# Patient Record
Sex: FEMALE | Race: WHITE | NOT HISPANIC OR LATINO
[De-identification: names, ages, dates, MRNs, and addresses within clinical notes are randomized per-mention and may not be internally consistent; named-entity substitution may affect disease eponyms.]

---

## 2022-08-12 PROBLEM — Z00.00 ENCOUNTER FOR PREVENTIVE HEALTH EXAMINATION: Status: ACTIVE | Noted: 2022-08-12

## 2022-08-16 ENCOUNTER — APPOINTMENT (OUTPATIENT)
Dept: OBGYN | Facility: CLINIC | Age: 32
End: 2022-08-16

## 2022-08-16 ENCOUNTER — RESULT CHARGE (OUTPATIENT)
Age: 32
End: 2022-08-16

## 2022-08-16 VITALS
WEIGHT: 135 LBS | BODY MASS INDEX: 23.92 KG/M2 | SYSTOLIC BLOOD PRESSURE: 100 MMHG | HEIGHT: 63 IN | DIASTOLIC BLOOD PRESSURE: 67 MMHG

## 2022-08-16 DIAGNOSIS — Z87.42 PERSONAL HISTORY OF OTHER DISEASES OF THE FEMALE GENITAL TRACT: ICD-10-CM

## 2022-08-16 DIAGNOSIS — Z80.0 FAMILY HISTORY OF MALIGNANT NEOPLASM OF DIGESTIVE ORGANS: ICD-10-CM

## 2022-08-16 DIAGNOSIS — Z78.9 OTHER SPECIFIED HEALTH STATUS: ICD-10-CM

## 2022-08-16 LAB — HCG UR QL: POSITIVE

## 2022-08-16 PROCEDURE — 76815 OB US LIMITED FETUS(S): CPT

## 2022-08-16 PROCEDURE — 81025 URINE PREGNANCY TEST: CPT

## 2022-08-16 PROCEDURE — 99204 OFFICE O/P NEW MOD 45 MIN: CPT

## 2022-08-16 RX ORDER — PSYLLIUM HUSK 3.4 G/12G
POWDER, FOR SUSPENSION ORAL
Refills: 0 | Status: ACTIVE | COMMUNITY

## 2022-08-16 RX ORDER — DOXYLAMINE SUCCINATE 25 MG
25 TABLET ORAL
Refills: 0 | Status: ACTIVE | COMMUNITY

## 2022-09-05 ENCOUNTER — NON-APPOINTMENT (OUTPATIENT)
Age: 32
End: 2022-09-05

## 2022-09-06 ENCOUNTER — NON-APPOINTMENT (OUTPATIENT)
Age: 32
End: 2022-09-06

## 2022-09-06 ENCOUNTER — APPOINTMENT (OUTPATIENT)
Dept: OBGYN | Facility: CLINIC | Age: 32
End: 2022-09-06

## 2022-09-06 VITALS
DIASTOLIC BLOOD PRESSURE: 69 MMHG | BODY MASS INDEX: 23.74 KG/M2 | WEIGHT: 134 LBS | HEIGHT: 63 IN | SYSTOLIC BLOOD PRESSURE: 110 MMHG

## 2022-09-06 PROCEDURE — 76816 OB US FOLLOW-UP PER FETUS: CPT

## 2022-09-06 PROCEDURE — 0501F PRENATAL FLOW SHEET: CPT

## 2022-09-08 NOTE — HISTORY OF PRESENT ILLNESS
[FreeTextEntry1] : 32-year-old female -0-1-0 at 7 and 4/7 weeks by LMP of 2022 presents for positive urine pregnancy test at home.  She is complaining of mild nausea but denies vomiting.  She has felt that the nausea has been well controlled by dietary modifications.  She has also been taking Unisom and B6 which she feels has helped with the nausea.  She denies vaginal bleeding.  She denies cramping.  She is taking prenatal vitamins.\par \par She has no significant past medical history.  Past surgical history includes a breast augmentation.  Family history is significant for a maternal grandmother with colon cancer.  Social history is negative x3.  OB history: -0-1-0.  She has a history of a medical termination of pregnancy.  She has no known drug allergies.  She is taking prenatal vitamins.  She is also taking Unisom and B6.

## 2022-09-08 NOTE — PLAN
[FreeTextEntry1] : 32-year-old female -0-1-0 at 7 and 4/7 weeks by LMP of 2022.  EDC confirmed by today's sonogram of 3/31/2023.  Do's and don'ts of pregnancy were reviewed with the patient.  She is complaining of significant nausea but has had relief with Unisom, B6 and dietary modifications.  She declines other medications at this time.  Call parameters were reviewed.  She will return to the office in 2 weeks for her first prenatal visit and sonogram.  The patient was given the opportunity to ask questions and all were answered to her satisfaction.\par

## 2022-09-21 ENCOUNTER — APPOINTMENT (OUTPATIENT)
Dept: ANTEPARTUM | Facility: CLINIC | Age: 32
End: 2022-09-21

## 2022-09-21 ENCOUNTER — ASOB RESULT (OUTPATIENT)
Age: 32
End: 2022-09-21

## 2022-09-21 ENCOUNTER — NON-APPOINTMENT (OUTPATIENT)
Age: 32
End: 2022-09-21

## 2022-09-21 PROCEDURE — 36415 COLL VENOUS BLD VENIPUNCTURE: CPT

## 2022-09-21 PROCEDURE — 76813 OB US NUCHAL MEAS 1 GEST: CPT

## 2022-09-23 ENCOUNTER — LABORATORY RESULT (OUTPATIENT)
Age: 32
End: 2022-09-23

## 2022-09-29 ENCOUNTER — NON-APPOINTMENT (OUTPATIENT)
Age: 32
End: 2022-09-29

## 2022-10-05 ENCOUNTER — APPOINTMENT (OUTPATIENT)
Dept: OBGYN | Facility: CLINIC | Age: 32
End: 2022-10-05

## 2022-10-05 VITALS
HEIGHT: 63 IN | BODY MASS INDEX: 23.92 KG/M2 | SYSTOLIC BLOOD PRESSURE: 110 MMHG | DIASTOLIC BLOOD PRESSURE: 70 MMHG | WEIGHT: 135 LBS

## 2022-10-05 PROCEDURE — 0502F SUBSEQUENT PRENATAL CARE: CPT

## 2022-10-06 LAB — AFP PNL SERPL: NORMAL

## 2022-11-02 ENCOUNTER — APPOINTMENT (OUTPATIENT)
Dept: OBGYN | Facility: CLINIC | Age: 32
End: 2022-11-02

## 2022-11-02 VITALS
SYSTOLIC BLOOD PRESSURE: 90 MMHG | HEIGHT: 63 IN | BODY MASS INDEX: 25.16 KG/M2 | DIASTOLIC BLOOD PRESSURE: 60 MMHG | WEIGHT: 142 LBS

## 2022-11-02 PROCEDURE — 0502F SUBSEQUENT PRENATAL CARE: CPT

## 2022-11-03 ENCOUNTER — NON-APPOINTMENT (OUTPATIENT)
Age: 32
End: 2022-11-03

## 2022-11-03 LAB
CLARI ADDITIONAL INFO: NORMAL
CLARI CHROMOSOME 13: NORMAL
CLARI CHROMOSOME 18: NORMAL
CLARI CHROMOSOME 21: NORMAL
CLARI SEX CHROMOSOMES: NORMAL
CLARI TEST COMMENT: NORMAL
CLARITEST NIPT: NORMAL
FETAL FRACT: NORMAL
GESTATION AGE: NORMAL
MATERNAL WEIGHT (LBS):: 165
PLEASE INCLUDE GENDER RESULTS ON THIS REPORT:: NORMAL
TYPE OF PREGNANCY:: NORMAL

## 2022-11-10 ENCOUNTER — APPOINTMENT (OUTPATIENT)
Dept: ANTEPARTUM | Facility: CLINIC | Age: 32
End: 2022-11-10

## 2022-11-10 ENCOUNTER — ASOB RESULT (OUTPATIENT)
Age: 32
End: 2022-11-10

## 2022-11-10 PROCEDURE — 76805 OB US >/= 14 WKS SNGL FETUS: CPT

## 2022-11-22 LAB — AFP PNL SERPL: NORMAL

## 2022-11-29 ENCOUNTER — NON-APPOINTMENT (OUTPATIENT)
Age: 32
End: 2022-11-29

## 2022-11-30 ENCOUNTER — APPOINTMENT (OUTPATIENT)
Dept: OBGYN | Facility: CLINIC | Age: 32
End: 2022-11-30
Payer: COMMERCIAL

## 2022-11-30 VITALS
HEIGHT: 63 IN | DIASTOLIC BLOOD PRESSURE: 70 MMHG | SYSTOLIC BLOOD PRESSURE: 110 MMHG | BODY MASS INDEX: 26.58 KG/M2 | WEIGHT: 150 LBS

## 2022-11-30 PROCEDURE — 0502F SUBSEQUENT PRENATAL CARE: CPT

## 2022-11-30 PROCEDURE — 90471 IMMUNIZATION ADMIN: CPT

## 2022-11-30 PROCEDURE — 90686 IIV4 VACC NO PRSV 0.5 ML IM: CPT

## 2022-12-23 ENCOUNTER — NON-APPOINTMENT (OUTPATIENT)
Age: 32
End: 2022-12-23

## 2022-12-28 ENCOUNTER — TRANSCRIPTION ENCOUNTER (OUTPATIENT)
Age: 32
End: 2022-12-28

## 2022-12-28 ENCOUNTER — APPOINTMENT (OUTPATIENT)
Dept: OBGYN | Facility: CLINIC | Age: 32
End: 2022-12-28

## 2022-12-28 VITALS
HEIGHT: 63 IN | WEIGHT: 153 LBS | DIASTOLIC BLOOD PRESSURE: 70 MMHG | SYSTOLIC BLOOD PRESSURE: 110 MMHG | BODY MASS INDEX: 27.11 KG/M2

## 2022-12-28 DIAGNOSIS — O35.1XX0 MATERNAL CARE FOR (SUSPECTED) CHROMOSOMAL ABNORMALITY IN FETUS, NOT APPLICABLE OR UNSPECIFIED: ICD-10-CM

## 2022-12-28 DIAGNOSIS — Z32.01 ENCOUNTER FOR PREGNANCY TEST, RESULT POSITIVE: ICD-10-CM

## 2022-12-28 PROCEDURE — 0502F SUBSEQUENT PRENATAL CARE: CPT

## 2023-01-03 ENCOUNTER — NON-APPOINTMENT (OUTPATIENT)
Age: 33
End: 2023-01-03

## 2023-01-03 LAB
BASOPHILS # BLD AUTO: 0.04 K/UL
BASOPHILS NFR BLD AUTO: 0.5 %
EOSINOPHIL # BLD AUTO: 0.04 K/UL
EOSINOPHIL NFR BLD AUTO: 0.5 %
GLUCOSE 1H P 50 G GLC PO SERPL-MCNC: 119 MG/DL
HCT VFR BLD CALC: 32.5 %
HGB BLD-MCNC: 10.5 G/DL
IMM GRANULOCYTES NFR BLD AUTO: 0.3 %
LYMPHOCYTES # BLD AUTO: 1.53 K/UL
LYMPHOCYTES NFR BLD AUTO: 19.9 %
MAN DIFF?: NORMAL
MCHC RBC-ENTMCNC: 31.8 PG
MCHC RBC-ENTMCNC: 32.3 GM/DL
MCV RBC AUTO: 98.5 FL
MONOCYTES # BLD AUTO: 0.42 K/UL
MONOCYTES NFR BLD AUTO: 5.5 %
NEUTROPHILS # BLD AUTO: 5.62 K/UL
NEUTROPHILS NFR BLD AUTO: 73.3 %
PLATELET # BLD AUTO: 256 K/UL
RBC # BLD: 3.3 M/UL
RBC # FLD: 13.2 %
WBC # FLD AUTO: 7.67 K/UL

## 2023-01-18 ENCOUNTER — APPOINTMENT (OUTPATIENT)
Dept: OBGYN | Facility: CLINIC | Age: 33
End: 2023-01-18
Payer: COMMERCIAL

## 2023-01-18 ENCOUNTER — EMERGENCY (EMERGENCY)
Facility: HOSPITAL | Age: 33
LOS: 1 days | Discharge: HOME | End: 2023-01-18
Attending: EMERGENCY MEDICINE
Payer: COMMERCIAL

## 2023-01-18 ENCOUNTER — OUTPATIENT (OUTPATIENT)
Dept: OUTPATIENT SERVICES | Facility: HOSPITAL | Age: 33
LOS: 1 days | End: 2023-01-18
Payer: COMMERCIAL

## 2023-01-18 VITALS
DIASTOLIC BLOOD PRESSURE: 69 MMHG | SYSTOLIC BLOOD PRESSURE: 120 MMHG | HEIGHT: 64 IN | TEMPERATURE: 99 F | OXYGEN SATURATION: 100 % | RESPIRATION RATE: 16 BRPM | HEART RATE: 101 BPM | WEIGHT: 164.91 LBS

## 2023-01-18 VITALS
OXYGEN SATURATION: 99 % | SYSTOLIC BLOOD PRESSURE: 106 MMHG | TEMPERATURE: 98 F | HEART RATE: 95 BPM | RESPIRATION RATE: 16 BRPM | DIASTOLIC BLOOD PRESSURE: 69 MMHG

## 2023-01-18 VITALS
DIASTOLIC BLOOD PRESSURE: 70 MMHG | WEIGHT: 157 LBS | BODY MASS INDEX: 27.82 KG/M2 | HEIGHT: 63 IN | SYSTOLIC BLOOD PRESSURE: 100 MMHG

## 2023-01-18 VITALS
HEART RATE: 88 BPM | TEMPERATURE: 99 F | SYSTOLIC BLOOD PRESSURE: 104 MMHG | DIASTOLIC BLOOD PRESSURE: 58 MMHG | RESPIRATION RATE: 16 BRPM

## 2023-01-18 VITALS — OXYGEN SATURATION: 100 % | HEART RATE: 95 BPM

## 2023-01-18 DIAGNOSIS — O47.03 FALSE LABOR BEFORE 37 COMPLETED WEEKS OF GESTATION, THIRD TRIMESTER: ICD-10-CM

## 2023-01-18 LAB
ALBUMIN SERPL ELPH-MCNC: 3.5 G/DL — SIGNIFICANT CHANGE UP (ref 3.3–5.2)
ALP SERPL-CCNC: 78 U/L — SIGNIFICANT CHANGE UP (ref 40–120)
ALT FLD-CCNC: 17 U/L — SIGNIFICANT CHANGE UP
ANION GAP SERPL CALC-SCNC: 10 MMOL/L — SIGNIFICANT CHANGE UP (ref 5–17)
APTT BLD: 25.2 SEC — LOW (ref 27.5–35.5)
AST SERPL-CCNC: 19 U/L — SIGNIFICANT CHANGE UP
BASOPHILS # BLD AUTO: 0.04 K/UL — SIGNIFICANT CHANGE UP (ref 0–0.2)
BASOPHILS NFR BLD AUTO: 0.4 % — SIGNIFICANT CHANGE UP (ref 0–2)
BILIRUB SERPL-MCNC: <0.2 MG/DL — LOW (ref 0.4–2)
BUN SERPL-MCNC: 7.6 MG/DL — LOW (ref 8–20)
CALCIUM SERPL-MCNC: 8.8 MG/DL — SIGNIFICANT CHANGE UP (ref 8.4–10.5)
CHLORIDE SERPL-SCNC: 104 MMOL/L — SIGNIFICANT CHANGE UP (ref 96–108)
CO2 SERPL-SCNC: 22 MMOL/L — SIGNIFICANT CHANGE UP (ref 22–29)
CREAT SERPL-MCNC: 0.39 MG/DL — LOW (ref 0.5–1.3)
D DIMER BLD IA.RAPID-MCNC: 202 NG/ML DDU — SIGNIFICANT CHANGE UP
EGFR: 136 ML/MIN/1.73M2 — SIGNIFICANT CHANGE UP
EOSINOPHIL # BLD AUTO: 0.03 K/UL — SIGNIFICANT CHANGE UP (ref 0–0.5)
EOSINOPHIL NFR BLD AUTO: 0.3 % — SIGNIFICANT CHANGE UP (ref 0–6)
GLUCOSE SERPL-MCNC: 75 MG/DL — SIGNIFICANT CHANGE UP (ref 70–99)
HCT VFR BLD CALC: 31.8 % — LOW (ref 34.5–45)
HGB BLD-MCNC: 11.1 G/DL — LOW (ref 11.5–15.5)
IMM GRANULOCYTES NFR BLD AUTO: 0.4 % — SIGNIFICANT CHANGE UP (ref 0–0.9)
INR BLD: 0.94 RATIO — SIGNIFICANT CHANGE UP (ref 0.88–1.16)
LYMPHOCYTES # BLD AUTO: 1.82 K/UL — SIGNIFICANT CHANGE UP (ref 1–3.3)
LYMPHOCYTES # BLD AUTO: 17.4 % — SIGNIFICANT CHANGE UP (ref 13–44)
MCHC RBC-ENTMCNC: 32.9 PG — SIGNIFICANT CHANGE UP (ref 27–34)
MCHC RBC-ENTMCNC: 34.9 GM/DL — SIGNIFICANT CHANGE UP (ref 32–36)
MCV RBC AUTO: 94.4 FL — SIGNIFICANT CHANGE UP (ref 80–100)
MONOCYTES # BLD AUTO: 0.63 K/UL — SIGNIFICANT CHANGE UP (ref 0–0.9)
MONOCYTES NFR BLD AUTO: 6 % — SIGNIFICANT CHANGE UP (ref 2–14)
NEUTROPHILS # BLD AUTO: 7.87 K/UL — HIGH (ref 1.8–7.4)
NEUTROPHILS NFR BLD AUTO: 75.5 % — SIGNIFICANT CHANGE UP (ref 43–77)
NT-PROBNP SERPL-SCNC: 48 PG/ML — SIGNIFICANT CHANGE UP (ref 0–300)
PLATELET # BLD AUTO: 252 K/UL — SIGNIFICANT CHANGE UP (ref 150–400)
POTASSIUM SERPL-MCNC: 4.2 MMOL/L — SIGNIFICANT CHANGE UP (ref 3.5–5.3)
POTASSIUM SERPL-SCNC: 4.2 MMOL/L — SIGNIFICANT CHANGE UP (ref 3.5–5.3)
PROT SERPL-MCNC: 6.2 G/DL — LOW (ref 6.6–8.7)
PROTHROM AB SERPL-ACNC: 10.9 SEC — SIGNIFICANT CHANGE UP (ref 10.5–13.4)
RAPID RVP RESULT: SIGNIFICANT CHANGE UP
RBC # BLD: 3.37 M/UL — LOW (ref 3.8–5.2)
RBC # FLD: 13.6 % — SIGNIFICANT CHANGE UP (ref 10.3–14.5)
SARS-COV-2 RNA SPEC QL NAA+PROBE: SIGNIFICANT CHANGE UP
SODIUM SERPL-SCNC: 136 MMOL/L — SIGNIFICANT CHANGE UP (ref 135–145)
TROPONIN T SERPL-MCNC: <0.01 NG/ML — SIGNIFICANT CHANGE UP (ref 0–0.06)
WBC # BLD: 10.43 K/UL — SIGNIFICANT CHANGE UP (ref 3.8–10.5)
WBC # FLD AUTO: 10.43 K/UL — SIGNIFICANT CHANGE UP (ref 3.8–10.5)

## 2023-01-18 PROCEDURE — 99284 EMERGENCY DEPT VISIT MOD MDM: CPT

## 2023-01-18 PROCEDURE — 83880 ASSAY OF NATRIURETIC PEPTIDE: CPT

## 2023-01-18 PROCEDURE — 85025 COMPLETE CBC W/AUTO DIFF WBC: CPT

## 2023-01-18 PROCEDURE — 93010 ELECTROCARDIOGRAM REPORT: CPT

## 2023-01-18 PROCEDURE — 0225U NFCT DS DNA&RNA 21 SARSCOV2: CPT

## 2023-01-18 PROCEDURE — 93308 TTE F-UP OR LMTD: CPT | Mod: 26

## 2023-01-18 PROCEDURE — 76857 US EXAM PELVIC LIMITED: CPT | Mod: 26

## 2023-01-18 PROCEDURE — 85379 FIBRIN DEGRADATION QUANT: CPT

## 2023-01-18 PROCEDURE — 93308 TTE F-UP OR LMTD: CPT

## 2023-01-18 PROCEDURE — 36415 COLL VENOUS BLD VENIPUNCTURE: CPT

## 2023-01-18 PROCEDURE — 84484 ASSAY OF TROPONIN QUANT: CPT

## 2023-01-18 PROCEDURE — 99213 OFFICE O/P EST LOW 20 MIN: CPT | Mod: GC

## 2023-01-18 PROCEDURE — 76857 US EXAM PELVIC LIMITED: CPT

## 2023-01-18 PROCEDURE — 76604 US EXAM CHEST: CPT

## 2023-01-18 PROCEDURE — 85610 PROTHROMBIN TIME: CPT

## 2023-01-18 PROCEDURE — 76604 US EXAM CHEST: CPT | Mod: 26

## 2023-01-18 PROCEDURE — 93005 ELECTROCARDIOGRAM TRACING: CPT

## 2023-01-18 PROCEDURE — 80053 COMPREHEN METABOLIC PANEL: CPT

## 2023-01-18 PROCEDURE — 99285 EMERGENCY DEPT VISIT HI MDM: CPT | Mod: 25

## 2023-01-18 PROCEDURE — 85730 THROMBOPLASTIN TIME PARTIAL: CPT

## 2023-01-18 PROCEDURE — 0502F SUBSEQUENT PRENATAL CARE: CPT

## 2023-01-18 NOTE — ED PROVIDER NOTE - NSFOLLOWUPINSTRUCTIONS_ED_ALL_ED_FT
1. Return to ED for worsening, progressive or any other concerning symptoms   2. Follow up with your primary care doctor in 2-3days   3. Go straight to L/D for fetal monitoring     Shortness of breath    Shortness of breath (dyspnea) means you have trouble breathing and could indicate a medical problem. Causes include lung disease, heart disease, low amount of red blood cells (anemia), poor physical fitness, being overweight, smoking, etc. Your health care provider today may not be able to find a cause for your shortness of breath after your exam. In this case, it is important to have a follow-up exam with your primary care physician as instructed. If medicines were prescribed, take them as directed for the full length of time directed. Refrain from tobacco products.    SEEK IMMEDIATE MEDICAL CARE IF YOU HAVE ANY OF THE FOLLOWING SYMPTOMS: worsening shortness of breath, chest pain, back pain, abdominal pain, fever, coughing up blood, lightheadedness/dizziness.

## 2023-01-18 NOTE — OB PROVIDER TRIAGE NOTE - NSOBPROVIDERNOTE_OBGYN_ALL_OB_FT
ZACHWANDA HOLGUIN is a 32y  at 29w5d GA who presents to L&D for fetal evaluation after being evaluated in the ED for SOB.    A/P:   -VSS  -O2 sat 100% on room air  -Chest XR shows no signs of pneumonia  -Echo performed with  normal EF  -Maternal and fetal status reassuring  -Patient is to follow up with OBGYN within a week  -Return precautions given.    Fetus: FHT reassuring for gestational age  [place]: no contractions  Dispo: Safe for discharge home    Discussed with Dr. Thomson

## 2023-01-18 NOTE — ED ADULT TRIAGE NOTE - CHIEF COMPLAINT QUOTE
pt states 30weeks pregnant has SOB, OB told pt to come to ED to r/o blood clot. pt denies swelling, fever, chills, cough

## 2023-01-18 NOTE — OB PROVIDER TRIAGE NOTE - NSHPPHYSICALEXAM_GEN_ALL_CORE
T(C): 37.1 (01-18-23 @ 19:44), Max: 37.1 (01-18-23 @ 19:44)  HR: 95 (01-18-23 @ 20:53) (88 - 101)  BP: 104/58 (01-18-23 @ 19:47) (104/58 - 120/69)  RR: 16 (01-18-23 @ 19:44) (16 - 16)  SpO2: 100% (01-18-23 @ 20:53) (99% - 100%)    Gen: NAD, well-appearing  Heart: S1 S2, RRR  Lungs: CTAB  Abd: soft, gravid  Ext: non-edematous, non-tender, no warmth or erythema    FHT: baseline FHR 130s, moderate variability, +accels, -decels  [place]: no contractions

## 2023-01-18 NOTE — ED PROVIDER NOTE - CLINICAL SUMMARY MEDICAL DECISION MAKING FREE TEXT BOX
31yo with F 30weeks pregnant with SOB that has since resolved, workup with negative d-dimer, no leukoccytosis, no sign abnormalities requiring intervention. LIkely viral illness. Troponin and BNP negative. Discussed findings with patient and agree to cancel duplex of LE as she has no leg swelling and d-dimer negative. Spoke with L/D will send for further monitoring.

## 2023-01-18 NOTE — ED PROVIDER NOTE - OBJECTIVE STATEMENT
31yo F  30 weeks, had acute onset of SOB at rest at work with a sore throat, went home and rested and it improved. had mild SOB yesterday and today but very minimal. no fever. +chills. no leg swelling. has mild anemia no bleeding.

## 2023-01-18 NOTE — ED PROVIDER NOTE - PATIENT PORTAL LINK FT
You can access the FollowMyHealth Patient Portal offered by [facility] by registering at the following website: [email or web address]. By joining [email or web address]’s FollowMyHealth portal, you will also be able to view your health information using other applications (apps) compatible with our system.

## 2023-01-18 NOTE — OB PROVIDER TRIAGE NOTE - HISTORY OF PRESENT ILLNESS
ZACH HOLGUIN is a 32y  at 29w5d GA who presents to L&D for fetal evaluation after being evaluated in the ED for SOB. Patient reports SOB since Monday. She also reports congestion that has been present throughout the entire pregnancy. She denies chest pain and palpitations. She denies fevers, chills, nausea, vomiting, cough, runny nose. Pt denies vaginal bleeding, contractions and leakage of fluid. She endorses good fetal movement. Pt denies headaches, visual disturbances, RUQ pain, epigastric pain and new-onset edema. She denies any urinary complaints.     Pregnancy course is significant for:  anemia    POB: sAB   PGYN: -fibroids/-cysts, denied STD hx, denies abnormal PAPs  PMH: denies  PSH: denies  SH: Denies tobacco use, EtOH use and illicit drug use during the pregnancy; Feels safe at home  Meds: PNVs, iron  All: NKDA

## 2023-01-18 NOTE — ED PROVIDER NOTE - PHYSICAL EXAMINATION
Gen: NAD, AOx3  Head: NCAT  HEENT: EOMI, oral mucosa moist, normal conjunctiva, neck supple  Lung: CTAB, no respiratory distress  CV: rrr, no murmur, Normal perfusion  Abd: soft, NTND, gravid uterus  MSK: No edema, no visible deformities  Neuro: No focal neurologic deficits  Skin: No rash   Psych: normal affect

## 2023-01-18 NOTE — OB PROVIDER TRIAGE NOTE - ATTENDING COMMENTS
32y  at 29w5d GA who presents to L&D for fetal evaluation after being evaluated in the ED for SOB. Patient reports SOB since Monday. She also reports congestion that has been present throughout the entire pregnancy. She denies chest pain and palpitations. She denies fevers, chills, nausea, vomiting, cough, runny nose. Pt denies vaginal bleeding, contractions and leakage of fluid. She endorses good fetal movement.  VSS  FHT - reassuring for GA  toco no contraction  RVP neg  34 y/o  @ 29+5 with SOB cleared by the ED now for fetal assessment   - maternal and fetal status reassuring   - cleared by ED with neg RVP   - plan d/c home and has outpt fu scheduled    Anne Marie Thomson MD

## 2023-01-19 ENCOUNTER — NON-APPOINTMENT (OUTPATIENT)
Age: 33
End: 2023-01-19

## 2023-02-02 ENCOUNTER — ASOB RESULT (OUTPATIENT)
Age: 33
End: 2023-02-02

## 2023-02-02 ENCOUNTER — APPOINTMENT (OUTPATIENT)
Dept: ANTEPARTUM | Facility: CLINIC | Age: 33
End: 2023-02-02
Payer: COMMERCIAL

## 2023-02-02 PROCEDURE — 76816 OB US FOLLOW-UP PER FETUS: CPT

## 2023-02-02 PROCEDURE — 76819 FETAL BIOPHYS PROFIL W/O NST: CPT

## 2023-02-08 ENCOUNTER — NON-APPOINTMENT (OUTPATIENT)
Age: 33
End: 2023-02-08

## 2023-02-09 ENCOUNTER — APPOINTMENT (OUTPATIENT)
Dept: OBGYN | Facility: CLINIC | Age: 33
End: 2023-02-09
Payer: COMMERCIAL

## 2023-02-09 VITALS
BODY MASS INDEX: 28.7 KG/M2 | WEIGHT: 162 LBS | HEIGHT: 63 IN | SYSTOLIC BLOOD PRESSURE: 101 MMHG | DIASTOLIC BLOOD PRESSURE: 66 MMHG

## 2023-02-09 PROCEDURE — 0502F SUBSEQUENT PRENATAL CARE: CPT

## 2023-02-22 ENCOUNTER — APPOINTMENT (OUTPATIENT)
Dept: OBGYN | Facility: CLINIC | Age: 33
End: 2023-02-22
Payer: COMMERCIAL

## 2023-02-22 VITALS
SYSTOLIC BLOOD PRESSURE: 124 MMHG | BODY MASS INDEX: 29.23 KG/M2 | DIASTOLIC BLOOD PRESSURE: 80 MMHG | WEIGHT: 165 LBS | HEIGHT: 63 IN

## 2023-02-22 DIAGNOSIS — Z23 ENCOUNTER FOR IMMUNIZATION: ICD-10-CM

## 2023-02-22 DIAGNOSIS — Z34.92 ENCOUNTER FOR SUPERVISION OF NORMAL PREGNANCY, UNSPECIFIED, SECOND TRIMESTER: ICD-10-CM

## 2023-02-22 PROCEDURE — 0502F SUBSEQUENT PRENATAL CARE: CPT

## 2023-02-22 PROCEDURE — 90471 IMMUNIZATION ADMIN: CPT

## 2023-02-22 PROCEDURE — 90715 TDAP VACCINE 7 YRS/> IM: CPT

## 2023-03-02 ENCOUNTER — APPOINTMENT (OUTPATIENT)
Dept: ANTEPARTUM | Facility: CLINIC | Age: 33
End: 2023-03-02
Payer: COMMERCIAL

## 2023-03-02 ENCOUNTER — ASOB RESULT (OUTPATIENT)
Age: 33
End: 2023-03-02

## 2023-03-02 PROCEDURE — 76816 OB US FOLLOW-UP PER FETUS: CPT

## 2023-03-02 PROCEDURE — 76819 FETAL BIOPHYS PROFIL W/O NST: CPT | Mod: 59

## 2023-03-03 NOTE — OB RN TRIAGE NOTE - TEMPERATURE IN FAHRENHEIT (DEGREES F)
Subjective:       Patient ID: Izzy Wilson is a 65 y.o. female.    Chief Complaint: Sore Throat (With headache and nausea x 2 days)    Symptoms for 2 days. Fever, body aches, chills. Sore throat.    Sore Throat   This is a new problem. The current episode started in the past 7 days. The maximum temperature recorded prior to her arrival was 102 - 102.9 F. Associated symptoms include congestion, coughing, ear pain (pressure), headaches and vomiting. Pertinent negatives include no abdominal pain, diarrhea or shortness of breath. Associated symptoms comments: Nausea, body aches  . She has tried NSAIDs for the symptoms.   Review of Systems   Constitutional:  Positive for appetite change, chills, fatigue and fever.   HENT:  Positive for congestion, ear pain (pressure) and sore throat.    Eyes: Negative.  Negative for visual disturbance.   Respiratory:  Positive for cough. Negative for shortness of breath and wheezing.    Cardiovascular: Negative.    Gastrointestinal:  Positive for vomiting. Negative for abdominal pain, diarrhea and nausea.   Endocrine: Negative.    Genitourinary: Negative.  Negative for difficulty urinating and urgency.   Musculoskeletal:  Positive for myalgias. Negative for arthralgias.   Skin: Negative.  Negative for color change.   Allergic/Immunologic: Negative.    Neurological:  Positive for headaches. Negative for dizziness.   Hematological: Negative.    Psychiatric/Behavioral: Negative.  Negative for sleep disturbance.          has dementia.   All other systems reviewed and are negative.    Objective:      Physical Exam  Vitals and nursing note reviewed.   Constitutional:       Appearance: Normal appearance. She is well-developed.   HENT:      Head: Normocephalic and atraumatic.      Right Ear: External ear normal. A middle ear effusion is present.      Left Ear: External ear normal. A middle ear effusion is present.      Nose: Mucosal edema, congestion and rhinorrhea present.       Mouth/Throat:      Mouth: Mucous membranes are moist.      Pharynx: Uvula midline. Posterior oropharyngeal erythema present.   Eyes:      Conjunctiva/sclera: Conjunctivae normal.   Neck:      Thyroid: No thyromegaly.      Trachea: Trachea normal.   Cardiovascular:      Rate and Rhythm: Normal rate and regular rhythm.      Pulses: Normal pulses.      Heart sounds: Normal heart sounds, S1 normal and S2 normal. No murmur heard.  Pulmonary:      Effort: Pulmonary effort is normal. No respiratory distress.      Breath sounds: Normal breath sounds.   Abdominal:      Palpations: Abdomen is soft.   Musculoskeletal:         General: Normal range of motion.      Cervical back: Normal range of motion and neck supple.   Lymphadenopathy:      Cervical: No cervical adenopathy.   Skin:     General: Skin is warm and dry.   Neurological:      Mental Status: She is alert and oriented to person, place, and time.   Psychiatric:         Mood and Affect: Mood normal.         Speech: Speech normal.       Assessment:       1. Cough, unspecified type    2. Pharyngitis, unspecified etiology    3. Bilateral acute serous otitis media, recurrence not specified    4. Candida vaginitis          Plan:     1. Cough, unspecified type  -     POCT COVID-19 Rapid Screening  -     POCT Influenza A/B Molecular    2. Pharyngitis, unspecified etiology  -     methylPREDNISolone acetate injection 60 mg  -     azithromycin (ZITHROMAX) 500 MG tablet; Take 1 tablet (500 mg total) by mouth once daily.  Dispense: 3 tablet; Refill: 0    3. Bilateral acute serous otitis media, recurrence not specified  -     methylPREDNISolone acetate injection 60 mg  -     azithromycin (ZITHROMAX) 500 MG tablet; Take 1 tablet (500 mg total) by mouth once daily.  Dispense: 3 tablet; Refill: 0    4. Candida vaginitis  -     fluconazole (DIFLUCAN) 150 MG Tab; 1 today and repeat in 1 week if necessary  Dispense: 2 tablet; Refill: 0    RTC PRN      98.8

## 2023-03-10 ENCOUNTER — NON-APPOINTMENT (OUTPATIENT)
Age: 33
End: 2023-03-10

## 2023-03-10 ENCOUNTER — APPOINTMENT (OUTPATIENT)
Dept: OBGYN | Facility: CLINIC | Age: 33
End: 2023-03-10
Payer: COMMERCIAL

## 2023-03-10 VITALS
BODY MASS INDEX: 30.12 KG/M2 | HEIGHT: 63 IN | SYSTOLIC BLOOD PRESSURE: 130 MMHG | DIASTOLIC BLOOD PRESSURE: 80 MMHG | WEIGHT: 170 LBS

## 2023-03-10 PROCEDURE — 0502F SUBSEQUENT PRENATAL CARE: CPT

## 2023-03-12 LAB — B-HEM STREP SPEC QL CULT: ABNORMAL

## 2023-03-15 ENCOUNTER — NON-APPOINTMENT (OUTPATIENT)
Age: 33
End: 2023-03-15

## 2023-03-17 ENCOUNTER — APPOINTMENT (OUTPATIENT)
Dept: OBGYN | Facility: CLINIC | Age: 33
End: 2023-03-17
Payer: COMMERCIAL

## 2023-03-17 VITALS
HEIGHT: 63 IN | WEIGHT: 170 LBS | DIASTOLIC BLOOD PRESSURE: 70 MMHG | SYSTOLIC BLOOD PRESSURE: 110 MMHG | BODY MASS INDEX: 30.12 KG/M2

## 2023-03-17 PROCEDURE — 0502F SUBSEQUENT PRENATAL CARE: CPT

## 2023-03-17 PROCEDURE — 59425 ANTEPARTUM CARE ONLY: CPT

## 2023-03-22 ENCOUNTER — LABORATORY RESULT (OUTPATIENT)
Age: 33
End: 2023-03-22

## 2023-03-22 ENCOUNTER — ASOB RESULT (OUTPATIENT)
Age: 33
End: 2023-03-22

## 2023-03-22 ENCOUNTER — APPOINTMENT (OUTPATIENT)
Dept: ANTEPARTUM | Facility: CLINIC | Age: 33
End: 2023-03-22
Payer: COMMERCIAL

## 2023-03-22 PROCEDURE — 76819 FETAL BIOPHYS PROFIL W/O NST: CPT | Mod: 59

## 2023-03-22 PROCEDURE — 36415 COLL VENOUS BLD VENIPUNCTURE: CPT

## 2023-03-22 PROCEDURE — 76816 OB US FOLLOW-UP PER FETUS: CPT

## 2023-03-23 ENCOUNTER — TRANSCRIPTION ENCOUNTER (OUTPATIENT)
Age: 33
End: 2023-03-23

## 2023-03-24 ENCOUNTER — NON-APPOINTMENT (OUTPATIENT)
Age: 33
End: 2023-03-24

## 2023-03-24 ENCOUNTER — TRANSCRIPTION ENCOUNTER (OUTPATIENT)
Age: 33
End: 2023-03-24

## 2023-03-24 ENCOUNTER — INPATIENT (INPATIENT)
Facility: HOSPITAL | Age: 33
LOS: 1 days | Discharge: HOME | End: 2023-03-26
Attending: OBSTETRICS & GYNECOLOGY | Admitting: OBSTETRICS & GYNECOLOGY
Payer: COMMERCIAL

## 2023-03-24 ENCOUNTER — APPOINTMENT (OUTPATIENT)
Dept: OBGYN | Facility: HOSPITAL | Age: 33
End: 2023-03-24

## 2023-03-24 VITALS
RESPIRATION RATE: 14 BRPM | WEIGHT: 169.98 LBS | DIASTOLIC BLOOD PRESSURE: 71 MMHG | TEMPERATURE: 98 F | HEART RATE: 96 BPM | SYSTOLIC BLOOD PRESSURE: 116 MMHG | HEIGHT: 63 IN

## 2023-03-24 DIAGNOSIS — Z98.82 BREAST IMPLANT STATUS: Chronic | ICD-10-CM

## 2023-03-24 LAB
BASOPHILS # BLD AUTO: 0.03 K/UL — SIGNIFICANT CHANGE UP (ref 0–0.2)
BASOPHILS NFR BLD AUTO: 0.3 % — SIGNIFICANT CHANGE UP (ref 0–2)
BLD GP AB SCN SERPL QL: SIGNIFICANT CHANGE UP
EOSINOPHIL # BLD AUTO: 0.06 K/UL — SIGNIFICANT CHANGE UP (ref 0–0.5)
EOSINOPHIL NFR BLD AUTO: 0.6 % — SIGNIFICANT CHANGE UP (ref 0–6)
HCT VFR BLD CALC: 34.5 % — SIGNIFICANT CHANGE UP (ref 34.5–45)
HGB BLD-MCNC: 11.5 G/DL — SIGNIFICANT CHANGE UP (ref 11.5–15.5)
HIV 1 & 2 AB SERPL IA.RAPID: SIGNIFICANT CHANGE UP
IMM GRANULOCYTES NFR BLD AUTO: 0.4 % — SIGNIFICANT CHANGE UP (ref 0–0.9)
LYMPHOCYTES # BLD AUTO: 1.62 K/UL — SIGNIFICANT CHANGE UP (ref 1–3.3)
LYMPHOCYTES # BLD AUTO: 17.2 % — SIGNIFICANT CHANGE UP (ref 13–44)
MCHC RBC-ENTMCNC: 32.4 PG — SIGNIFICANT CHANGE UP (ref 27–34)
MCHC RBC-ENTMCNC: 33.3 GM/DL — SIGNIFICANT CHANGE UP (ref 32–36)
MCV RBC AUTO: 97.2 FL — SIGNIFICANT CHANGE UP (ref 80–100)
MONOCYTES # BLD AUTO: 0.38 K/UL — SIGNIFICANT CHANGE UP (ref 0–0.9)
MONOCYTES NFR BLD AUTO: 4 % — SIGNIFICANT CHANGE UP (ref 2–14)
NEUTROPHILS # BLD AUTO: 7.3 K/UL — SIGNIFICANT CHANGE UP (ref 1.8–7.4)
NEUTROPHILS NFR BLD AUTO: 77.5 % — HIGH (ref 43–77)
PLATELET # BLD AUTO: 209 K/UL — SIGNIFICANT CHANGE UP (ref 150–400)
RBC # BLD: 3.55 M/UL — LOW (ref 3.8–5.2)
RBC # FLD: 13.7 % — SIGNIFICANT CHANGE UP (ref 10.3–14.5)
SARS-COV-2 RNA SPEC QL NAA+PROBE: SIGNIFICANT CHANGE UP
WBC # BLD: 9.43 K/UL — SIGNIFICANT CHANGE UP (ref 3.8–10.5)
WBC # FLD AUTO: 9.43 K/UL — SIGNIFICANT CHANGE UP (ref 3.8–10.5)

## 2023-03-24 PROCEDURE — 59515 CESAREAN DELIVERY: CPT | Mod: U9

## 2023-03-24 RX ORDER — FAMOTIDINE 10 MG/ML
20 INJECTION INTRAVENOUS ONCE
Refills: 0 | Status: COMPLETED | OUTPATIENT
Start: 2023-03-24 | End: 2023-03-24

## 2023-03-24 RX ORDER — SODIUM CHLORIDE 9 MG/ML
1000 INJECTION, SOLUTION INTRAVENOUS
Refills: 0 | Status: DISCONTINUED | OUTPATIENT
Start: 2023-03-24 | End: 2023-03-26

## 2023-03-24 RX ORDER — LANOLIN
1 OINTMENT (GRAM) TOPICAL EVERY 6 HOURS
Refills: 0 | Status: DISCONTINUED | OUTPATIENT
Start: 2023-03-24 | End: 2023-03-26

## 2023-03-24 RX ORDER — TETANUS TOXOID, REDUCED DIPHTHERIA TOXOID AND ACELLULAR PERTUSSIS VACCINE, ADSORBED 5; 2.5; 8; 8; 2.5 [IU]/.5ML; [IU]/.5ML; UG/.5ML; UG/.5ML; UG/.5ML
0.5 SUSPENSION INTRAMUSCULAR ONCE
Refills: 0 | Status: DISCONTINUED | OUTPATIENT
Start: 2023-03-24 | End: 2023-03-26

## 2023-03-24 RX ORDER — ENOXAPARIN SODIUM 100 MG/ML
40 INJECTION SUBCUTANEOUS EVERY 24 HOURS
Refills: 0 | Status: DISCONTINUED | OUTPATIENT
Start: 2023-03-25 | End: 2023-03-26

## 2023-03-24 RX ORDER — OXYTOCIN 10 UNIT/ML
333.33 VIAL (ML) INJECTION
Qty: 20 | Refills: 0 | Status: DISCONTINUED | OUTPATIENT
Start: 2023-03-24 | End: 2023-03-26

## 2023-03-24 RX ORDER — SIMETHICONE 80 MG/1
80 TABLET, CHEWABLE ORAL EVERY 4 HOURS
Refills: 0 | Status: DISCONTINUED | OUTPATIENT
Start: 2023-03-24 | End: 2023-03-26

## 2023-03-24 RX ORDER — SODIUM CHLORIDE 9 MG/ML
1000 INJECTION, SOLUTION INTRAVENOUS
Refills: 0 | Status: DISCONTINUED | OUTPATIENT
Start: 2023-03-24 | End: 2023-03-24

## 2023-03-24 RX ORDER — CITRIC ACID/SODIUM CITRATE 300-500 MG
30 SOLUTION, ORAL ORAL ONCE
Refills: 0 | Status: COMPLETED | OUTPATIENT
Start: 2023-03-24 | End: 2023-03-24

## 2023-03-24 RX ORDER — SCOPALAMINE 1 MG/3D
1 PATCH, EXTENDED RELEASE TRANSDERMAL ONCE
Refills: 0 | Status: COMPLETED | OUTPATIENT
Start: 2023-03-24 | End: 2023-03-24

## 2023-03-24 RX ORDER — IBUPROFEN 200 MG
600 TABLET ORAL EVERY 6 HOURS
Refills: 0 | Status: COMPLETED | OUTPATIENT
Start: 2023-03-24 | End: 2024-02-20

## 2023-03-24 RX ORDER — MAGNESIUM HYDROXIDE 400 MG/1
30 TABLET, CHEWABLE ORAL
Refills: 0 | Status: DISCONTINUED | OUTPATIENT
Start: 2023-03-24 | End: 2023-03-26

## 2023-03-24 RX ORDER — SODIUM CHLORIDE 9 MG/ML
1000 INJECTION, SOLUTION INTRAVENOUS ONCE
Refills: 0 | Status: COMPLETED | OUTPATIENT
Start: 2023-03-24 | End: 2023-03-24

## 2023-03-24 RX ORDER — OXYCODONE HYDROCHLORIDE 5 MG/1
5 TABLET ORAL
Refills: 0 | Status: DISCONTINUED | OUTPATIENT
Start: 2023-03-24 | End: 2023-03-26

## 2023-03-24 RX ORDER — ACETAMINOPHEN 500 MG
975 TABLET ORAL ONCE
Refills: 0 | Status: COMPLETED | OUTPATIENT
Start: 2023-03-24 | End: 2023-03-24

## 2023-03-24 RX ORDER — OXYCODONE HYDROCHLORIDE 5 MG/1
5 TABLET ORAL ONCE
Refills: 0 | Status: DISCONTINUED | OUTPATIENT
Start: 2023-03-24 | End: 2023-03-26

## 2023-03-24 RX ORDER — CEFAZOLIN SODIUM 1 G
2000 VIAL (EA) INJECTION ONCE
Refills: 0 | Status: DISCONTINUED | OUTPATIENT
Start: 2023-03-24 | End: 2023-03-26

## 2023-03-24 RX ORDER — DIPHENHYDRAMINE HCL 50 MG
25 CAPSULE ORAL EVERY 6 HOURS
Refills: 0 | Status: DISCONTINUED | OUTPATIENT
Start: 2023-03-24 | End: 2023-03-26

## 2023-03-24 RX ORDER — ACETAMINOPHEN 500 MG
975 TABLET ORAL
Refills: 0 | Status: DISCONTINUED | OUTPATIENT
Start: 2023-03-24 | End: 2023-03-26

## 2023-03-24 RX ORDER — KETOROLAC TROMETHAMINE 30 MG/ML
30 SYRINGE (ML) INJECTION EVERY 6 HOURS
Refills: 0 | Status: DISCONTINUED | OUTPATIENT
Start: 2023-03-24 | End: 2023-03-25

## 2023-03-24 RX ORDER — CEFAZOLIN SODIUM 1 G
2000 VIAL (EA) INJECTION ONCE
Refills: 0 | Status: DISCONTINUED | OUTPATIENT
Start: 2023-03-24 | End: 2023-03-24

## 2023-03-24 RX ADMIN — Medication 975 MILLIGRAM(S): at 15:07

## 2023-03-24 RX ADMIN — SODIUM CHLORIDE 2000 MILLILITER(S): 9 INJECTION, SOLUTION INTRAVENOUS at 15:06

## 2023-03-24 RX ADMIN — Medication 1000 MILLIUNIT(S)/MIN: at 18:39

## 2023-03-24 RX ADMIN — SCOPALAMINE 1 PATCH: 1 PATCH, EXTENDED RELEASE TRANSDERMAL at 19:41

## 2023-03-24 RX ADMIN — Medication 975 MILLIGRAM(S): at 20:47

## 2023-03-24 RX ADMIN — SCOPALAMINE 1 PATCH: 1 PATCH, EXTENDED RELEASE TRANSDERMAL at 15:06

## 2023-03-24 RX ADMIN — Medication 30 MILLILITER(S): at 16:33

## 2023-03-24 RX ADMIN — FAMOTIDINE 20 MILLIGRAM(S): 10 INJECTION INTRAVENOUS at 16:33

## 2023-03-24 RX ADMIN — Medication 975 MILLIGRAM(S): at 16:35

## 2023-03-24 NOTE — OB RN DELIVERY SUMMARY - NSPOSTDELSIGNOUT_OBGYN_ALL_OB
Florida. Yes

## 2023-03-24 NOTE — OB RN INTRAOPERATIVE NOTE - NS_CELLSAVER _OBGYN_ALL_OB
This is a recent snapshot of the patient's [facility] medical record.  For current drug dose and complete information and questions, call [phone] or In Basket pool, [facility] home infusion ([id number])  CSN Number:  [id number]       No

## 2023-03-24 NOTE — OB PROVIDER H&P - ASSESSMENT
A/P: Patient is a 32 year old  at 39w admitted for scheduled elective pCS  -Admit to L&D  -Consent  -Admission labs  -NPO, except ice chips   -IV fluids  -Fetus: Cat I tracing  -GBS: Negative, no GBS ppx required     Discussed with Dr. Barakat

## 2023-03-24 NOTE — DISCHARGE NOTE OB - PLAN OF CARE
- Please call your provider to schedule a postoperative wound check in 1-2 weeks. Contact information provided below.  - Prescriptions have been sent to pharmacy. Please take medications as directed.   - Patient is to continue with regular diet and activity as tolerated, nothing per vagina for 6 weeks, and exclusive breast feeding for the first 6 months is recommended.   - If you have additional concerns, or if you experience fevers > 100.4 F or heavy vaginal bleeding that is not decreasing, please inform your provider.

## 2023-03-24 NOTE — OB PROVIDER H&P - HISTORY OF PRESENT ILLNESS
Patient is a 32 year old  at 39w by who presents to L&D for scheduled elective pCS. Denies contractions, leakage of fluid, and vaginal bleeding. Reports fetal movement. No complaints at this time.          JOE:  3/31/23   LMP:      Pregnancy course:    LGA EFW 3272g, EFW 92%ile AC 81%ile   Anemia       Obhx: G1 () TOP   Gynhx: Denies   Pmhx:  Anemia   Pshx:  Denies   Meds:  PNV, iron    Allergies:  NKDA   Social Hx: Denies x3

## 2023-03-24 NOTE — OB RN DELIVERY SUMMARY - NSSELHIDDEN_OBGYN_ALL_OB_FT
[NS_DeliveryAttending1_OBGYN_ALL_OB_FT:[id number]=],[NS_DeliveryAssist1_OBGYN_ALL_OB_FT:[id number]=],[NS_DeliveryRN_OBGYN_ALL_OB_FT:[id number]=],[NS_CirculateRN2_OBGYN_ALL_OB_FT:[id number]=]

## 2023-03-24 NOTE — DISCHARGE NOTE OB - CARE PROVIDER_API CALL
Colten Patiño)  Obstetrics and Gynecology  [address] [facility], [facility] [address] [place] [address]  Phone: [phone]  Fax: [phone]  Follow Up Time: 1 week

## 2023-03-24 NOTE — OB PROVIDER H&P - ATTENDING COMMENTS
32 year old  at 39w by who presents to L&D for scheduled elective pCS, consent signed after questions answered emphasizing the safety of vaginal birth, risk to her and her baby of bleeding and infections, and risk of abnormal placentation in future pregnancies. Patient defers labor.

## 2023-03-24 NOTE — OB PROVIDER H&P - NSHPPHYSICALEXAM_GEN_ALL_CORE
Vital Signs Last 24 Hrs  T(C): 36.8 (24 Mar 2023 14:18), Max: 36.8 (24 Mar 2023 14:18)  T(F): 98.2 (24 Mar 2023 14:18), Max: 98.2 (24 Mar 2023 14:18)  HR: 96 (24 Mar 2023 14:37) (96 - 96)  BP: 116/71 (24 Mar 2023 14:37) (116/71 - 116/71)  RR: 14 (24 Mar 2023 14:18) (14 - 14)    Gen: AAOx3  Abd: soft, gravid  Ext: no tenderness to calf palpation  FHT: baseline 130, moderate variability, +accels, -decels  [place]: Irregular contractions

## 2023-03-24 NOTE — OB RN INTRAOPERATIVE NOTE - NSSELHIDDEN_OBGYN_ALL_OB_FT
[NS_DeliveryAttending1_OBGYN_ALL_OB_FT:[id number]],[NS_DeliveryAssist1_OBGYN_ALL_OB_FT:[id number]=],[NS_DeliveryRN_OBGYN_ALL_OB_FT:[id number]=],[NS_CirculateRN2_OBGYN_ALL_OB_FT:[id number]=]

## 2023-03-24 NOTE — OB RN PATIENT PROFILE - NS PRO DEPRESSION SCREENING Y/N1
A1c improved and making good progress, but diabetes still not well controlled.  Recommend he proceed with nutritional/diabetic counseling.  Order was sent at last visit.   no

## 2023-03-24 NOTE — OB RN DELIVERY SUMMARY - NS_SEPSISRSKCALC_OBGYN_ALL_OB_FT
No temperature has been documented for this patient in CPN or on the OB Flowsheet. Ensure the highest temperature during labor was documented on the OB Flowsheet.  No gestational age at birth has been documented. Ensure delivery date/time has been entered above.  Rupture of membranes must be entered above.   EOS calculated successfully. EOS Risk Factor: 0.5/1000 live births ([facility] national incidence); GA=39w;Temp=98.2; ROM=0.017; GBS='Unknown'; Antibiotics='GBS specific antibiotics > 2 hrs prior to birth'

## 2023-03-24 NOTE — DISCHARGE NOTE OB - NS MD DC FALL RISK RISK
For information on Fall & Injury Prevention, visit: [email or web address] OR  [email or web address] OR  https://www.cdc.gov/steadi/patient.html

## 2023-03-24 NOTE — OB PROVIDER DELIVERY SUMMARY - NSSELHIDDEN_OBGYN_ALL_OB_FT
[NS_DeliveryAttending1_OBGYN_ALL_OB_FT:[id number]],[NS_DeliveryAssist1_OBGYN_ALL_OB_FT:[id number]=],[NS_DeliveryRN_OBGYN_ALL_OB_FT:[id number]=],[NS_CirculateRN2_OBGYN_ALL_OB_FT:[id number]=]

## 2023-03-24 NOTE — OB RN PATIENT PROFILE - FUNCTIONAL ASSESSMENT - BASIC MOBILITY 6.
4-calculated by average/Not able to assess (calculate score using [facility] averaging method)

## 2023-03-24 NOTE — OB PROVIDER H&P - NSLASTOTHERPREGNANCY_OBGYN_ALL_OB_DT

## 2023-03-24 NOTE — DISCHARGE NOTE OB - PATIENT PORTAL LINK FT
You can access the FollowMyHealth Patient Portal offered by [facility] by registering at the following website: [email or web address]. By joining [email or web address]’s FollowMyHealth portal, you will also be able to view your health information using other applications (apps) compatible with our system.

## 2023-03-24 NOTE — DISCHARGE NOTE OB - MEDICATION SUMMARY - MEDICATIONS TO TAKE
Include Z78.9 (Other Specified Conditions Influencing Health Status) As An Associated Diagnosis?: No Consent: The risks of atrophy were reviewed with the patient. Treatment Number (Optional): 2 Detail Level: Detailed Administered By (Optional): TAM Total Volume Injected (Ccs- Only Use Numbers And Decimals): .1 X Size Of Lesion In Cm (Optional): 0 Medical Necessity Clause: This procedure was medically necessary because the lesions that were treated were: Concentration Of Solution Injected (Mg/Ml): 5.0 Kenalog Preparation: Kenalog I will START or STAY ON the medications listed below when I get home from the hospital:    ibuprofen 600 mg oral tablet  -- 1 tab(s) by mouth every 6 hours as needed for  moderate pain MDD: 2400  -- Indication: For pain    Tylenol 325 mg oral capsule  -- 3 cap(s) by mouth every 6 hours as needed for  moderate pain  -- Indication: For pain    oxyCODONE 5 mg oral tablet  -- 1 tab(s) by mouth every 6 hours as needed for  severe pain MDD: 50  -- Indication: For severe pain    ferrous sulfate 325 mg (65 mg elemental iron) oral tablet  -- 1 tab(s) by mouth once a day  -- Indication: For acute on chronic blood loss anemia

## 2023-03-24 NOTE — OB PROVIDER DELIVERY SUMMARY - NSPROVIDERDELIVERYNOTE_OBGYN_ALL_OB_FT
Pre-op diagnosis: viable intrauterine pregnancy at 39 weeks gestation  post-op: same   Procedure: primary low transverse  section  Surgeon: Dr. Barakat  Assistant: Dr. Danielle, PGY-1  Anesthesiologist: Dr. Reyez [facility] CRNA  Anesthesia: Spinal  EBL: 186  UOP: 550  IVF: 800  Findings: viable male infant, apgars 8/9, weight 3310, cephalic presentation. Grossly normal uterus, tubes, ovaries   Dictation Number:[id number]

## 2023-03-24 NOTE — DISCHARGE NOTE OB - WILL THE PATIENT ACCEPT THE PFIZER COVID-19 VACCINE IF ELIGIBLE AND IT IS AVAILABLE?
"7/26/2022    Physician No Ref-Primary  No address on file    RE: Tye Bertrand       Dear Colleague,     I had the pleasure of seeing Tye Bertrand in the [facility].  [facility]    I had the pleasure of seeing Edward when he came for follow up of AFib.  This 82 year old sees Dr. Vinson for his history of:    1. Permanent AFib, SND - had recurrent AFib despite AFib ablation x 2. S/p dual-chamber [employer] PPM implant 11/2016 and ILR, which have been implanted for lightheadedness, was removed after sx'c SND discovered.  2. On chronic AC - CHADSVASc 3 (CAD, age). Warfarin  3. CAD, Aortic Stenosis - s/p 4v CABG 1/2009 (LIMA/LAD, rSVG/D1/OM2, rSVG/RCA) and bioprosthetic AVR 1/2019. Hospitalized with CP 7/2021 with negative trops. OP w/u rec'd  4. Dyslipidemia      I last saw Edward 11/2021 and we reviewed his echocardiogram showing significant TR and elevated RVSP with IVC dilatation.  At that time, he blamed his swollen legs on years of skating causing \"big muscles.\"  Given his echocardiogram findings, he did agree to start torsemide 20 mg daily.  Follow-up BMP looks good he was to see me back in ~ 6 weeks but I am now just seeing him back today.    Interval History:  Edward is doing well. Notes compression stockings really improve the edema. No orthopnea, PND. Weight is down. He's working on losing weight and eats less at mealtime.    No CP. No dizziness, lightheadedness, palpitations noted.    VITALS:  Vitals: /74 (BP Location: Left arm, Cuff Size: Adult Large)   Pulse 75   Ht 1.753 m (5' 9\")   Wt 87.5 kg (192 lb 12.8 oz)   SpO2 98%   BMI 28.47 kg/m      Diagnostic Testing:  Device interrogation 6/2022, showed 33%  in VVI.  EGM's showed episodes of RVR lasting up to 14 seconds with rates to 160 bpm  Echocardiogram 7/2021-EF 55-60%.  Moderate-severe dilatation of RV with moderately decreased RVSF.  Severe MAC with 1+ MR.  Mild MS with MVA 2.3 cm  and mean " gradient of 3 mmHg of 72 bpm.  2-3+ TR, with RVSP 40+ RAP.  Bioprosthetic aortic valve with mean gradient 10 mmHg (wnl).  IVC significantly dilated  Stress Testing 9/2016-small reversible apical defect c/w small area of ischemia. Nl EF  Component      Latest Ref Rng & Units 1/13/2022 6/27/2022 7/13/2022   Sodium      136 - 145 mmol/L 141 142 141   Potassium      3.5 - 5.0 mmol/L 4.1 3.7    Chloride      98 - 107 mmol/L 102 102    Carbon Dioxide      22 - 31 mmol/L 29 30    Anion Gap      7 - 15 mmol/L 10 10 11   Urea Nitrogen      8 - 28 mg/dL 31 (H) 25    Creatinine      0.67 - 1.17 mg/dL 0.97 0.71 0.78   Calcium      8.8 - 10.2 mg/dL 9.4 9.3 9.4   Glucose      70 - 125 mg/dL 70 81    GFR Estimate      >60 mL/min/1.73m2 78 >90 90     Component      Latest Ref Rng & Units 3/31/2022 5/3/2022 6/23/2022   WBC      4.0 - 11.0 10e3/uL 6.8 5.8 6.9   RBC Count      4.40 - 5.90 10e6/uL 3.42 (L) 3.43 (L) 3.64 (L)   Hemoglobin      13.3 - 17.7 g/dL 10.7 (L) 10.5 (L) 11.1 (L)   Hematocrit      40.0 - 53.0 % 33.7 (L) 34.6 (L) 35.3 (L)   MCV      78 - 100 fL 99 101 (H) 97   MCH      26.5 - 33.0 pg 31.3 30.6 30.5   MCHC      31.5 - 36.5 g/dL 31.8 30.3 (L) 31.4 (L)   RDW      10.0 - 15.0 % 14.9 17.2 (H) 15.8 (H)   Platelet Count      150 - 450 10e3/uL 217 283 208     Component      Latest Ref Rng & Units 6/28/2022 7/12/2022 7/19/2022   INR      0.85 - 1.15 2.83 (H) 1.77 (H) 2.11 (H)     Plan:  Echo in 6 months and see me in follow-up to review    Assessment/Plan:    1. Permanent AFib, SND    Attempts at PVI were unsuccessful with long-term rhythm maintenance x 2    ILR showed significant bradycardia 2016, resulting in dual-chamber [employer] pacemaker placed.  ILR was removed.    Last device interrogation 6/2022 showed overall good HR control    Remains on warfarin for [id number] at least 3 (CAD, age). INR today PENDING    PLAN:    Continue low dose metoprolol tartrate 25 mg BID    Continjue warfarin for high CHADSVASc  scoer    2. Fluid retention, 2-3+ TR    Echo showed preserved EF 7/2021, with concern for fluid retention (dilated IVC, significant TR, elevated RVSP).    At our visit 11/2021, he had 2-3+ LE edema, with 10 cm JVD     After starting torsemide 11/2021, follow-up blood work looked really good.  I had him continue torsemide 20 mg daily    He's down >10#. No crackles. Still with significant JVD/V wave prominence.     PLAN:    Continue torsemide 10 mg daily    See me 6 m with echo    3. H/o CAD/AVR    No c/o CP    Last echo with nl LVEF 7/2021    PLAN:    Repeat echo to assess bioprosthetic Aortic Valve in 6 months per routine. See me at that time to cut down on trips.     SBE prophylaxis        Christianne Khan PA-C, MSPAS      Orders Placed This Encounter   Procedures     Follow-Up with Cardiology VIOLET     Echocardiogram Complete     No orders of the defined types were placed in this encounter.    There are no discontinued medications.      Encounter Diagnoses   Name Primary?     SOB (shortness of breath)      History of coronary artery bypass graft      Mitral valve insufficiency, unspecified etiology Yes     Encounter for follow-up for aortic valve replacement        CURRENT MEDICATIONS:  Current Outpatient Medications   Medication Sig Dispense Refill     acetaminophen (TYLENOL) 500 MG tablet Take 1,000 mg by mouth daily       aspirin (ASA) 81 MG EC tablet Take 1 tablet (81 mg) by mouth daily 30 tablet 1     cyanocobalamin (VITAMIN B-12) 1000 MCG tablet Take 1,000 mcg by mouth daily       gabapentin (NEURONTIN) 100 MG capsule Take 200 mg by mouth 2 times daily       gabapentin (NEURONTIN) 300 MG capsule Take 300 mg by mouth At Bedtime       metoprolol tartrate (LOPRESSOR) 25 MG tablet Take 1 tablet (25 mg) by mouth 2 times daily  1     Omega-3 Fatty Acids (FISH OIL ULTRA) 1000 MG CAPS Take 1 capsule by mouth 3 times daily       omeprazole (PRILOSEC) 10 MG DR capsule Take 10 mg by mouth daily       polyethylene glycol 3350  "POWD Take 17 g by mouth daily as needed        polyethylene glycol-propylene glycol (SYSTANE ULTRA) 0.4-0.3 % SOLN ophthalmic solution Place 1 drop into both eyes 4 times daily as needed for dry eyes        SENNOSIDES PO Take 1 tablet by mouth 2 times daily as needed        simvastatin (ZOCOR) 20 MG tablet TAKE 1 TABLET BY MOUTH EVERY NIGHT AT BEDTIME 90 tablet 3     torsemide (DEMADEX) 20 MG tablet Take 1 tablet (20 mg) by mouth daily 30 tablet 5     vitamin D3 (CHOLECALCIFEROL) 50 mcg (2000 units) tablet Take 1 tablet by mouth daily       Warfarin Sodium (COUMADIN PO) Take 4 mg by mouth on Monday, Tuesday, Wednesday, Thursday, Saturday and Sunday. Take 2.5 mg by mouth on Friday.       citalopram (CELEXA) 10 MG tablet Take 10 mg by mouth daily       mirtazapine (REMERON) 7.5 MG tablet Take 7.5 mg by mouth At Bedtime         ALLERGIES     Allergies   Allergen Reactions     Dust Mites          Review of Systems:  Skin:  Negative bruising   Eyes:  Positive for cataracts  ENT:  Negative nasal congestion  Respiratory:  Negative for shortness of breath;dyspnea on exertion;cough  Cardiovascular:  Negative for;palpitations;chest pain;lightheadedness;dizziness Positive for;edema  Gastroenterology: Positive for constipation  Genitourinary:  Negative nocturia;urinary frequency  Musculoskeletal:  Positive for back pain  Neurologic:  Negative stroke  Psychiatric:  Positive for depression  Heme/Lymph/Imm:  Negative allergies  Endocrine:  Negative diabetes    Physical Exam:  Vitals: /74 (BP Location: Left arm, Cuff Size: Adult Large)   Pulse 75   Ht 1.753 m (5' 9\")   Wt 87.5 kg (192 lb 12.8 oz)   SpO2 98%   BMI 28.47 kg/m      Constitutional:  cooperative, alert and oriented, well developed, well nourished, in no acute distress        Skin:  warm and dry to the touch, no apparent skin lesions or masses noted        Head:  normocephalic, no masses or lesions        Eyes:  pupils equal and round;conjunctivae and lids " unremarkable;sclera white        ENT:  not assessed this visit        Neck:    JVP 10-12;hepatojugular reflux;JVP >12      Chest:  normal breath sounds, clear to auscultation, normal A-P diameter, normal symmetry, normal respiratory excursion, no use of accessory muscles        Cardiac: regular rhythm       systolic ejection murmur;RUSB systolic murmur;LLSB;LUSB;grade 2 holodiastolic murmur;grade 2      Abdomen:  abdomen soft        Vascular: not assessed this visit                                      Extremities and Back:  no deformities, clubbing, cyanosis, erythema observed        Neurological:      Wheelchair bound        PAST MEDICAL HISTORY:  Past Medical History:   Diagnosis Date     Ankle wound, left, sequela 4/16/2018     Aortic valve disorders 1/15/2009    AVR with 25mm tissue prosthesis     Arthritis      Atrial flutter (H)      Cancer (H)     prostate cancer     Colitis      Coronary artery disease 1/15/2009    LIMA to LAD, reverse SVG to 1st diagonal and 2nd Marginal, and reverse diagonal to RCA     Dizziness 3/30/2016    chronic,low grade      Gynecomastia, male 4/30/2014     Hyperlipemia      Hypertension      Nasal fracture 4/29/2015     Neuropathy      Persistent atrial fibrillation (H)      Pneumonia 3/10/2016     Sinus node dysfunction (H)      Syncope and collapse 5/2/2014       PAST SURGICAL HISTORY:  Past Surgical History:   Procedure Laterality Date     CARDIOVERSION  5/24/12    flutter     COLONOSCOPY N/A 5/28/2019    Procedure: COLONOSCOPY;  Surgeon: Cyrus Alonso MD;  Location:  GI     CORONARY ANGIOGRAPHY ADULT ORDER  12/29/2008     CORONARY ARTERY BYPASS  1/15/2009    LIMA to LAD, reverse SVG to 1st diagonal and 2nd Marginal, and reverse diagonal to RCA     H ABLATION FOCAL AFIB  4/4/2014     H ABLATION FOCAL AFIB  5/24/2012     PROSTATECTOMY RETROPUBIC RADICAL  2001     Dr. Dang; last PSA? ,  abcess in colon     RECTAL SURGERY  2006    anal fistula repair and 2007; Dr. Goldberg      REPLACE VALVE AORTIC  1/15/2009    25 mm tissue prosthesis     VASCULAR SURGERY         FAMILY HISTORY:  Family History   Problem Relation Age of Onset     Heart Disease Father 43        MI     Cancer Brother         Liver     Heart Disease Mother        SOCIAL HISTORY:  Social History     Socioeconomic History     Marital status: Single     Spouse name: None     Number of children: None     Years of education: None     Highest education level: None   Occupational History     Occupation: [employer] agency,     Employer: RETIRED   Tobacco Use     Smoking status: Never Smoker     Smokeless tobacco: Never Used   Substance and Sexual Activity     Alcohol use: No     Alcohol/week: 0.0 standard drinks     Drug use: No     Sexual activity: Never   Other Topics Concern     Parent/sibling w/ CABG, MI or angioplasty before 65F 55M? Yes     Special Diet No     Exercise No   Social History Narrative    Sociology and psychology degree, minor in biology             Thank you for allowing me to participate in the care of your patient.      Sincerely,     Marlyn Khan PA-C     [facility]  cc:   Marlyn Khan PA-C  [address]  [place]  [place] [address]         No

## 2023-03-24 NOTE — OB RN PATIENT PROFILE - FALL HARM RISK - UNIVERSAL INTERVENTIONS
Bed in lowest position, wheels locked, appropriate side rails in place/Call bell, personal items and telephone in reach/Instruct patient to call for assistance before getting out of bed or chair/Non-slip footwear when patient is out of bed/[place] to call system/Physically safe environment - no spills, clutter or unnecessary equipment/Purposeful Proactive Rounding/Room/bathroom lighting operational, light cord in reach

## 2023-03-24 NOTE — DISCHARGE NOTE OB - HOSPITAL COURSE
s/p elective primary  section. Surgery was uncomplicated. She was transferred from L&D to postpartum unit without complications during her stay. Upon discharge she is voiding, tolerating PO, ambulating, lochia is decreasing, labs and vitals were stable, and pain is well controlled.

## 2023-03-25 ENCOUNTER — NON-APPOINTMENT (OUTPATIENT)
Age: 33
End: 2023-03-25

## 2023-03-25 LAB
BASOPHILS # BLD AUTO: 0.04 K/UL — SIGNIFICANT CHANGE UP (ref 0–0.2)
BASOPHILS NFR BLD AUTO: 0.3 % — SIGNIFICANT CHANGE UP (ref 0–2)
COVID-19 SPIKE DOMAIN AB INTERP: POSITIVE
COVID-19 SPIKE DOMAIN ANTIBODY RESULT: >250 U/ML — HIGH
EOSINOPHIL # BLD AUTO: 0.04 K/UL — SIGNIFICANT CHANGE UP (ref 0–0.5)
EOSINOPHIL NFR BLD AUTO: 0.3 % — SIGNIFICANT CHANGE UP (ref 0–6)
HCT VFR BLD CALC: 29.6 % — LOW (ref 34.5–45)
HGB BLD-MCNC: 9.6 G/DL — LOW (ref 11.5–15.5)
HIV 1+2 AB+HIV1 P24 AG SERPL QL IA: SIGNIFICANT CHANGE UP
IMM GRANULOCYTES NFR BLD AUTO: 0.6 % — SIGNIFICANT CHANGE UP (ref 0–0.9)
LYMPHOCYTES # BLD AUTO: 1.77 K/UL — SIGNIFICANT CHANGE UP (ref 1–3.3)
LYMPHOCYTES # BLD AUTO: 14.1 % — SIGNIFICANT CHANGE UP (ref 13–44)
MCHC RBC-ENTMCNC: 32.1 PG — SIGNIFICANT CHANGE UP (ref 27–34)
MCHC RBC-ENTMCNC: 32.4 GM/DL — SIGNIFICANT CHANGE UP (ref 32–36)
MCV RBC AUTO: 99 FL — SIGNIFICANT CHANGE UP (ref 80–100)
MONOCYTES # BLD AUTO: 0.73 K/UL — SIGNIFICANT CHANGE UP (ref 0–0.9)
MONOCYTES NFR BLD AUTO: 5.8 % — SIGNIFICANT CHANGE UP (ref 2–14)
NEUTROPHILS # BLD AUTO: 9.9 K/UL — HIGH (ref 1.8–7.4)
NEUTROPHILS NFR BLD AUTO: 78.9 % — HIGH (ref 43–77)
PLATELET # BLD AUTO: 182 K/UL — SIGNIFICANT CHANGE UP (ref 150–400)
RBC # BLD: 2.99 M/UL — LOW (ref 3.8–5.2)
RBC # FLD: 13.8 % — SIGNIFICANT CHANGE UP (ref 10.3–14.5)
SARS-COV-2 IGG+IGM SERPL QL IA: >250 U/ML — HIGH
SARS-COV-2 IGG+IGM SERPL QL IA: POSITIVE
T PALLIDUM AB TITR SER: NEGATIVE — SIGNIFICANT CHANGE UP
WBC # BLD: 12.56 K/UL — HIGH (ref 3.8–10.5)
WBC # FLD AUTO: 12.56 K/UL — HIGH (ref 3.8–10.5)

## 2023-03-25 RX ORDER — IBUPROFEN 200 MG
600 TABLET ORAL EVERY 6 HOURS
Refills: 0 | Status: DISCONTINUED | OUTPATIENT
Start: 2023-03-25 | End: 2023-03-26

## 2023-03-25 RX ADMIN — Medication 30 MILLIGRAM(S): at 18:00

## 2023-03-25 RX ADMIN — Medication 30 MILLIGRAM(S): at 05:00

## 2023-03-25 RX ADMIN — Medication 975 MILLIGRAM(S): at 09:00

## 2023-03-25 RX ADMIN — SCOPALAMINE 1 PATCH: 1 PATCH, EXTENDED RELEASE TRANSDERMAL at 18:41

## 2023-03-25 RX ADMIN — ENOXAPARIN SODIUM 40 MILLIGRAM(S): 100 INJECTION SUBCUTANEOUS at 08:30

## 2023-03-25 RX ADMIN — Medication 30 MILLIGRAM(S): at 00:01

## 2023-03-25 RX ADMIN — Medication 30 MILLIGRAM(S): at 13:15

## 2023-03-25 RX ADMIN — Medication 975 MILLIGRAM(S): at 02:00

## 2023-03-25 RX ADMIN — Medication 30 MILLIGRAM(S): at 17:45

## 2023-03-25 RX ADMIN — Medication 975 MILLIGRAM(S): at 15:25

## 2023-03-25 RX ADMIN — MAGNESIUM HYDROXIDE 30 MILLILITER(S): 400 TABLET, CHEWABLE ORAL at 12:59

## 2023-03-25 RX ADMIN — Medication 975 MILLIGRAM(S): at 21:05

## 2023-03-25 RX ADMIN — Medication 30 MILLIGRAM(S): at 12:58

## 2023-03-25 RX ADMIN — Medication 600 MILLIGRAM(S): at 23:48

## 2023-03-26 VITALS
HEART RATE: 77 BPM | DIASTOLIC BLOOD PRESSURE: 68 MMHG | TEMPERATURE: 98 F | OXYGEN SATURATION: 97 % | SYSTOLIC BLOOD PRESSURE: 101 MMHG | RESPIRATION RATE: 18 BRPM

## 2023-03-26 PROCEDURE — 86780 TREPONEMA PALLIDUM: CPT

## 2023-03-26 PROCEDURE — 86850 RBC ANTIBODY SCREEN: CPT

## 2023-03-26 PROCEDURE — 59050 FETAL MONITOR W/REPORT: CPT

## 2023-03-26 PROCEDURE — 86703 HIV-1/HIV-2 1 RESULT ANTBDY: CPT

## 2023-03-26 PROCEDURE — 87389 HIV-1 AG W/HIV-1&-2 AB AG IA: CPT

## 2023-03-26 PROCEDURE — 36415 COLL VENOUS BLD VENIPUNCTURE: CPT

## 2023-03-26 PROCEDURE — 86901 BLOOD TYPING SEROLOGIC RH(D): CPT

## 2023-03-26 PROCEDURE — 86769 SARS-COV-2 COVID-19 ANTIBODY: CPT

## 2023-03-26 PROCEDURE — U0005: CPT

## 2023-03-26 PROCEDURE — U0003: CPT

## 2023-03-26 PROCEDURE — 85025 COMPLETE CBC W/AUTO DIFF WBC: CPT

## 2023-03-26 PROCEDURE — 90707 MMR VACCINE SC: CPT

## 2023-03-26 PROCEDURE — 86900 BLOOD TYPING SEROLOGIC ABO: CPT

## 2023-03-26 RX ORDER — OXYCODONE HYDROCHLORIDE 5 MG/1
1 TABLET ORAL
Qty: 16 | Refills: 0
Start: 2023-03-26 | End: 2023-03-29

## 2023-03-26 RX ORDER — IBUPROFEN 200 MG
1 TABLET ORAL
Qty: 28 | Refills: 0
Start: 2023-03-26 | End: 2023-04-01

## 2023-03-26 RX ORDER — POLYETHYLENE GLYCOL 3350 17 G/17G
17 POWDER, FOR SOLUTION ORAL DAILY
Refills: 0 | Status: DISCONTINUED | OUTPATIENT
Start: 2023-03-26 | End: 2023-03-26

## 2023-03-26 RX ORDER — FERROUS SULFATE 325(65) MG
325 TABLET ORAL DAILY
Refills: 0 | Status: DISCONTINUED | OUTPATIENT
Start: 2023-03-26 | End: 2023-03-26

## 2023-03-26 RX ORDER — FERROUS SULFATE 325(65) MG
1 TABLET ORAL
Qty: 30 | Refills: 0
Start: 2023-03-26 | End: 2023-04-24

## 2023-03-26 RX ORDER — ACETAMINOPHEN 500 MG
3 TABLET ORAL
Qty: 84 | Refills: 0
Start: 2023-03-26 | End: 2023-04-01

## 2023-03-26 RX ADMIN — Medication 600 MILLIGRAM(S): at 11:36

## 2023-03-26 RX ADMIN — Medication 0.5 MILLILITER(S): at 13:14

## 2023-03-26 RX ADMIN — Medication 975 MILLIGRAM(S): at 02:22

## 2023-03-26 RX ADMIN — ENOXAPARIN SODIUM 40 MILLIGRAM(S): 100 INJECTION SUBCUTANEOUS at 08:48

## 2023-03-26 RX ADMIN — Medication 600 MILLIGRAM(S): at 05:59

## 2023-03-26 RX ADMIN — Medication 975 MILLIGRAM(S): at 08:48

## 2023-03-26 RX ADMIN — Medication 325 MILLIGRAM(S): at 11:36

## 2023-03-26 NOTE — PROGRESS NOTE ADULT - SUBJECTIVE AND OBJECTIVE BOX
INTERVAL HPI/OVERNIGHT EVENTS:  32y Female s/p c section under spinal anesthesia with duramorph for post op analgesia on 03/24/23    Vital Signs Last 24 Hrs  T(C): 36.8 (25 Mar 2023 13:12), Max: 37.6 (24 Mar 2023 21:22)  T(F): 98.3 (25 Mar 2023 13:12), Max: 99.7 (24 Mar 2023 21:22)  HR: 91 (25 Mar 2023 13:12) (89 - 109)  BP: 129/73 (25 Mar 2023 13:12) (104/72 - 140/72)  BP(mean): --  RR: 18 (25 Mar 2023 13:12) (14 - 25)  SpO2: 98% (25 Mar 2023 13:12) (97% - 100%)    Parameters below as of 24 Mar 2023 20:38  Patient On (Oxygen Delivery Method): room air            Patient satisfied    Patients pain is well controlled     No respiratory events overnight    No pruritis at this time    Patient seen and doing well     No headache      No residual numbness or weakness, sensory and motor function intact    Site not examined    No anesthetic complications or complaints noted or reported          .            
ZACH HOLGUIN is a 32y  s/p elective pCS @ 39w, uncomplicated  POD1    SUBJECTIVE:  No acute events overnight.  Patient has no complaints.  Pain is well controlled.  +flatus, -voiding, -BM.  Ambulating and tolerating PO.  Appropriate lochia.    OBJECTIVE:  Physical exam:  General: AOx3, NAD.  Abdomen: Soft, appropriately tender to palpitation, fundus firm.  Incision: Dressing removed revealing clean, dry and intact incision  Vaginal: expectant lochia  Ext: No DVT signs, warm extremities.    Vital Signs Last 24 Hrs  T(C): 36.9 (25 Mar 2023 15:40), Max: 37.1 (25 Mar 2023 08:00)  T(F): 98.4 (25 Mar 2023 15:40), Max: 98.7 (25 Mar 2023 08:00)  HR: 94 (25 Mar 2023 15:40) (91 - 94)  BP: 119/77 (25 Mar 2023 15:40) (96/63 - 129/73)  RR: 18 (25 Mar 2023 15:40) (18 - 18)  SpO2: 100% (25 Mar 2023 15:40) (96% - 100%)      LABS:                        9.6    12.56 )-----------( 182      ( 25 Mar 2023 14:56 )             29.6       
ZACH HOLGUIN is a 32y  s/p elective pCS @ 39w, uncomplicated  POD2    SUBJECTIVE:  No acute events overnight.  Patient has no complaints.  Pain is well controlled.  +flatus, +voiding, -BM.  Ambulating and tolerating PO.  Appropriate lochia.    OBJECTIVE:  Physical exam:  General: AOx3, NAD.  Abdomen: Soft, appropriately tender to palpitation, fundus firm.  Incision: clean, dry and intact incision  Vaginal: expectant lochia  Ext: No DVT signs, warm extremities.    ICU Vital Signs Last 24 Hrs  T(C): 36.9 (26 Mar 2023 03:32), Max: 37.1 (25 Mar 2023 08:00)  T(F): 98.4 (26 Mar 2023 03:32), Max: 98.7 (25 Mar 2023 08:00)  HR: 77 (26 Mar 2023 03:32) (77 - 94)  BP: 101/68 (26 Mar 2023 03:32) (96/63 - 129/73)  RR: 18 (26 Mar 2023 03:32) (18 - 18)  SpO2: 97% (26 Mar 2023 03:32) (96% - 100%)        LABS:                        9.6    12.56 )-----------( 182      ( 25 Mar 2023 14:56 )             29.6

## 2023-03-26 NOTE — PROGRESS NOTE ADULT - ASSESSMENT
A/P: ZACH HOLGUIN is a 32y  s/p elective pCS @ 39w, uncomplicated  POD1    #Routine postpartum care  - Stable, doing well postpartum  - Hgb 11.5 > 9.6  - Pain: well controlled, c/w current regimen  - GI: c/w regular diet, normal bowel function  - : pending AM TOV, lochia decreasing   - DVT ppx: SCDs, ambulation encouraged, lovenox   - Healthy baby boy, desires circ  - Dispo: will reassess tomorrow morning
A/P: ZACH HOLGUIN is a 32y  s/p elective pCS @ 39w, uncomplicated  POD2    #Routine postpartum care  - Stable, doing well postpartum  - Hgb 11.5 > 9.6  - Pain: well controlled, c/w current regimen  - GI: c/w regular diet, normal bowel function  - : voiding, lochia decreasing   - DVT ppx: SCDs, ambulation encouraged, lovenox   - Healthy baby boy, desires circ  - Dispo: for discharge home pending circ

## 2023-03-26 NOTE — PROGRESS NOTE ADULT - ATTENDING COMMENTS
pt seen; consent for circumcision obtained after discussing risks/benefits with patient including risk of bleeding, infection

## 2023-04-04 ENCOUNTER — APPOINTMENT (OUTPATIENT)
Dept: OBGYN | Facility: CLINIC | Age: 33
End: 2023-04-04
Payer: COMMERCIAL

## 2023-04-04 VITALS
SYSTOLIC BLOOD PRESSURE: 110 MMHG | BODY MASS INDEX: 30.12 KG/M2 | DIASTOLIC BLOOD PRESSURE: 70 MMHG | WEIGHT: 170 LBS | HEIGHT: 63 IN

## 2023-04-04 DIAGNOSIS — Z09 ENCOUNTER FOR FOLLOW-UP EXAMINATION AFTER COMPLETED TREATMENT FOR CONDITIONS OTHER THAN MALIGNANT NEOPLASM: ICD-10-CM

## 2023-04-04 DIAGNOSIS — Z98.891 HISTORY OF UTERINE SCAR FROM PREVIOUS SURGERY: ICD-10-CM

## 2023-04-04 DIAGNOSIS — Z34.93 ENCOUNTER FOR SUPERVISION OF NORMAL PREGNANCY, UNSPECIFIED, THIRD TRIMESTER: ICD-10-CM

## 2023-04-04 DIAGNOSIS — O28.3 ABNORMAL ULTRASONIC FINDING ON ANTENATAL SCREENING OF MOTHER: ICD-10-CM

## 2023-04-04 DIAGNOSIS — O99.019 ANEMIA COMPLICATING PREGNANCY, UNSPECIFIED TRIMESTER: ICD-10-CM

## 2023-04-04 DIAGNOSIS — O09.299 SUPERVISION OF PREGNANCY WITH OTHER POOR REPRODUCTIVE OR OBSTETRIC HISTORY, UNSPECIFIED TRIMESTER: ICD-10-CM

## 2023-04-04 DIAGNOSIS — Z87.42 PERSONAL HISTORY OF OTHER DISEASES OF THE FEMALE GENITAL TRACT: ICD-10-CM

## 2023-04-04 DIAGNOSIS — O21.9 VOMITING OF PREGNANCY, UNSPECIFIED: ICD-10-CM

## 2023-04-04 PROCEDURE — 99024 POSTOP FOLLOW-UP VISIT: CPT

## 2023-04-04 NOTE — COUNSELING
[Nutrition/ Exercise/ Weight Management] : nutrition, exercise, weight management [Vitamins/Supplements] : vitamins/supplements [Contraception/ Emergency Contraception/ Safe Sexual Practices] : contraception, emergency contraception, safe sexual practices [Medication Management] : medication management [Pre/Post Op Instructions] : pre/post op instructions

## 2023-04-04 NOTE — HISTORY OF PRESENT ILLNESS
[FreeTextEntry1] : 32-year-old female -0-1-1 presenting office for her postoperative appointment status post primary low transverse  section at [facility] on 3/24/2023 delivering a baby boy weighing 3310 g, with Apgars 8 and 9.   section was elective primary as per patient request.  Her pregnancy was complicated by an echogenic intracardiac focus, anemia pregnancy, LARCs for gestational age.  Patient is doing well postoperatively and her pain is well controlled with medications as written.  She has both breast and bottlefeeding at this time.  She has no signs or symptoms of inflammation or infection and her Steri-Strips are in place.

## 2023-04-04 NOTE — PHYSICAL EXAM
[Appropriately responsive] : appropriately responsive [Alert] : alert [No Acute Distress] : no acute distress [Soft] : soft [Non-tender] : non-tender [Non-distended] : non-distended [No HSM] : No HSM [No Lesions] : no lesions [No Mass] : no mass [Oriented x3] : oriented x3 [FreeTextEntry7] : Pfannenstiel incision healing well; Steri-Strips in place

## 2023-04-04 NOTE — PLAN
[FreeTextEntry1] : \par Patient is doing well postoperatively and is once again directed on wound care.  Steri-Strips were removed and incision appears to be healing well.  She has both breast and bottlefeeding and she was counseled on expectations regarding the return of her menstrual period.  Patient does not wish for contraception at this time, but understands it may be made available upon her request.  She will return to office in 4 weeks for her postpartum appointment.  All questions addressed.

## 2023-05-09 ENCOUNTER — APPOINTMENT (OUTPATIENT)
Dept: OBGYN | Facility: CLINIC | Age: 33
End: 2023-05-09
Payer: COMMERCIAL

## 2023-05-09 VITALS
DIASTOLIC BLOOD PRESSURE: 70 MMHG | SYSTOLIC BLOOD PRESSURE: 120 MMHG | HEIGHT: 63 IN | WEIGHT: 148 LBS | BODY MASS INDEX: 26.22 KG/M2

## 2023-05-09 DIAGNOSIS — Z30.09 ENCOUNTER FOR OTHER GENERAL COUNSELING AND ADVICE ON CONTRACEPTION: ICD-10-CM

## 2023-05-09 PROCEDURE — 0503F POSTPARTUM CARE VISIT: CPT

## 2023-05-09 NOTE — HISTORY OF PRESENT ILLNESS
[FreeTextEntry1] : 32-year-old female -0-1-1 presenting office for postpartum appointment and to discuss options for birth control.  She is status post primary low transverse  section at [facility] on 3/24/2023 delivering a baby boy weighing 3310 g, with Apgars 8 and 9.   section was elective primary as per patient request.  Her pregnancy was complicated by an echogenic intracardiac focus, anemia pregnancy, LARCs for gestational age.  Patient is doing well postoperatively and her pain is well controlled with medications as written.  \par \par She is now bottlefeeding and her menstrual period returned this past .  She does have a history of dysmenorrhea, and this was a relatively light period.

## 2023-05-09 NOTE — PHYSICAL EXAM
[Appropriately responsive] : appropriately responsive [Alert] : alert [No Acute Distress] : no acute distress [Soft] : soft [Non-tender] : non-tender [Non-distended] : non-distended [No HSM] : No HSM [No Lesions] : no lesions [No Mass] : no mass [Oriented x3] : oriented x3 [FreeTextEntry7] : Pfannenstiel incision healing well

## 2023-06-06 ENCOUNTER — APPOINTMENT (OUTPATIENT)
Dept: OBGYN | Facility: CLINIC | Age: 33
End: 2023-06-06
Payer: COMMERCIAL

## 2023-06-06 VITALS
HEIGHT: 63 IN | WEIGHT: 148 LBS | BODY MASS INDEX: 26.22 KG/M2 | DIASTOLIC BLOOD PRESSURE: 70 MMHG | SYSTOLIC BLOOD PRESSURE: 110 MMHG

## 2023-06-06 DIAGNOSIS — Z30.430 ENCOUNTER FOR INSERTION OF INTRAUTERINE CONTRACEPTIVE DEVICE: ICD-10-CM

## 2023-06-06 LAB — HCG UR QL: NEGATIVE

## 2023-06-06 PROCEDURE — 81025 URINE PREGNANCY TEST: CPT

## 2023-06-06 PROCEDURE — 58300 INSERT INTRAUTERINE DEVICE: CPT

## 2023-06-06 RX ORDER — MISOPROSTOL 200 UG/1
200 TABLET ORAL
Qty: 1 | Refills: 0 | Status: COMPLETED | COMMUNITY
Start: 2023-05-09 | End: 2023-06-06

## 2023-06-06 RX ORDER — LEVONORGESTREL 52 MG/1
20 INTRAUTERINE DEVICE INTRAUTERINE
Refills: 0 | Status: ACTIVE | COMMUNITY
Start: 2023-06-06 | End: 2031-06-06

## 2023-06-06 NOTE — HISTORY OF PRESENT ILLNESS
[FreeTextEntry1] : 33-year-old female -0-1-1 presenting office for her Mirena IUD insertion.  Risks, benefits, and alternatives of the insertion process and mechanism of action of the medication were discussed at a prior appointment and again will be repeated today prior to insertion.  Patient did place Cytotec 200 mcg, as directed the night prior.  Her in office urine pregnancy test is negative.

## 2023-06-06 NOTE — PHYSICAL EXAM
[Appropriately responsive] : appropriately responsive [Alert] : alert [No Acute Distress] : no acute distress [Soft] : soft [Non-tender] : non-tender [Non-distended] : non-distended [No HSM] : No HSM [No Lesions] : no lesions [No Mass] : no mass [Oriented x3] : oriented x3 [FreeTextEntry7] : Pfannenstiel incision healing well [Labia Majora] : normal [Labia Minora] : normal [Normal] : normal [FreeTextEntry6] : Mirena IUD placed as described

## 2023-06-06 NOTE — PLAN
[FreeTextEntry1] : \par Mirena IUD Lot number [id number] with expiration date of May 2025 was placed as described above.  She is given opportunity ask questions both prior to and post procedure and all questions were addressed.  She tolerated procedure well.  She was given call, follow-up, ER precautions regarding signs and symptoms of abnormal uterine bleeding and infection.  She will return to office in 1 month time for follow-up and IUD confirmation.  She was given signs and symptoms of malpresentation.  She was directed on monthly self examinations.

## 2023-08-01 ENCOUNTER — APPOINTMENT (OUTPATIENT)
Dept: ANTEPARTUM | Facility: CLINIC | Age: 33
End: 2023-08-01

## 2023-08-01 ENCOUNTER — APPOINTMENT (OUTPATIENT)
Dept: OBGYN | Facility: CLINIC | Age: 33
End: 2023-08-01

## 2024-01-11 NOTE — OB RN PREOPERATIVE CHECKLIST - DENTURES
UNIFINE PENTIPS 32G X 4 MM MISC USE TO INJECT INSULIN 5 TIMES DAILY Yes Praful Buckner MD   apixaban (ELIQUIS) 5 MG TABS tablet Take 1 tablet by mouth 2 times daily Yes Automatic Reconciliation, Ar   aspirin 81 MG EC tablet Take by mouth daily Yes Automatic Reconciliation, Ar   atorvastatin (LIPITOR) 40 MG tablet Take 1 tablet by mouth daily Yes Automatic Reconciliation, Ar   cinacalcet (SENSIPAR) 30 MG tablet Take 1 tablet by mouth daily Yes Automatic Reconciliation, Ar   diclofenac sodium (VOLTAREN) 1 % GEL 2 (TWO) GRAM APPLY TO AFFECTED AREA FOUR TIMES DAILY Yes Automatic Reconciliation, Ar   Insulin Aspart, w/Niacinamide, (FIASP FLEXTOUCH) 100 UNIT/ML SOPN Inject 16 units before meals + 4 units for every 50 mg/dl above 150 mg/dl.  Max 65 units/day Yes Automatic Reconciliation, Ar   K-Phos-Neutral (K PHOS NEUTRAL) 155-852-130 MG tablet Take 1 tablet by mouth daily Yes Automatic Reconciliation, Ar   labetalol (NORMODYNE) 200 MG tablet Take 1 tablet by mouth Yes Automatic Reconciliation, Ar   magnesium oxide (MAG-OX) 400 MG tablet Take 1 tablet by mouth 2 times daily Yes Automatic Reconciliation, Ar   mycophenolate (CELLCEPT) 250 MG capsule Take 1 capsule by mouth Yes Automatic Reconciliation, Ar   PARoxetine Mesylate 7.5 MG CAPS paroxetine mesylate (menopausal symptoms suppressant) 7.5 mg capsule   TAKE 1 CAPSULE BY MOUTH EVERY DAY Yes Automatic Reconciliation, Ar   predniSONE (DELTASONE) 5 MG tablet Take 1 tablet by mouth daily Yes Automatic Reconciliation, Ar   tacrolimus (PROGRAF) 1 MG capsule Take 2 capsules by mouth Yes Automatic Reconciliation, Ar       CareTeam (Including outside providers/suppliers regularly involved in providing care):   Patient Care Team:  Kendell Delgado MD as PCP - General (Internal Medicine)  Kendell Delgado MD as PCP - Empaneled Provider  Demetrio Sherman MD as Surgeon  Dallas, Jammie JESUS RN as Ambulatory Care Manager  Praful Buckner MD as Consulting 
no

## 2024-08-27 NOTE — OB PROVIDER TRIAGE NOTE - NS_DISCHARGEPRINT_OBGYN_ALL_OB
Called and left message for patient today.   Informed patient to call the office back.   Was calling patient to go over antibiotic plan, weekly labs and follow up appointment.     OB Discharge Instructions

## 2024-11-25 NOTE — OB RN PATIENT PROFILE - SPIRITUAL CULTURAL, RELIGIOUS PRACTICES/VALUES, PROFILE
[phone]    Phoned patient to let him know his Rx: buPROPion XL (WELLBUTRIN XL) 150   Was sent at his [id number].    Per Dr. Tatum   N/A

## 2025-04-02 NOTE — OB PROVIDER H&P - [email or web address]
[facility] Radiation Oncology  [address] [facility]  [facility] [address]  Dept Phone: [phone]    Radiation Oncology On Treatment Visit Note    Patient Name:  Leonides Hernandez  YOB: 1954  MRN:  [id number]  Dictating Physician:  Kentrell Hassan MD    Diagnosis:  Malignant neoplasm of prostate  (CMD) C61    Cancer Staging   Malignant neoplasm of prostate  (CMD)  Staging form: Prostate, AJCC 8th Edition  - Clinical: Stage IIIA (cT1c, cN0, cM0, PSA: 20.6, Grade Group: 4) - Signed by Kentrell Hassan MD on 1/16/2025      Radiation Treatments       Active   Pelvis Prostate & SV (Started on 4/1/2025)   Most recent fraction: 200 cGy given on 4/2/2025   Total given: 400 cGy / 4,600 cGy  (2 of 23 fractions)   Elapsed Days: 1   Technique: VMAT                     Doing well.  Notes baseline pre-treatment nocturia 1 time per night.  Taking flomax 0.4mg daily.  Denies any other complaints.    Vitals:    04/02/25 1002   BP: 118/77   BP Location: LUE - Left upper extremity   Patient Position: Sitting   Cuff Size: Regular   Pulse: 71   Resp: 18   Temp: 96.8 °F (36 °C)   TempSrc: Temporal   SpO2: 100%   Weight: 63 kg (139 lb)   PainSc:  0       Exam at baseline and unremarkable.    Imaging studies were done as ordered using on-board imager and compared with reference images. We will be watching the patient closely. We will proceed as planned.       No